# Patient Record
Sex: FEMALE | Race: BLACK OR AFRICAN AMERICAN | NOT HISPANIC OR LATINO | Employment: STUDENT | URBAN - METROPOLITAN AREA
[De-identification: names, ages, dates, MRNs, and addresses within clinical notes are randomized per-mention and may not be internally consistent; named-entity substitution may affect disease eponyms.]

---

## 2020-03-31 PROBLEM — A54.9 GONORRHEA IN FEMALE: Status: ACTIVE | Noted: 2020-03-31

## 2020-07-31 PROBLEM — F33.1 MAJOR DEPRESSIVE DISORDER, RECURRENT, MODERATE: Status: ACTIVE | Noted: 2020-07-31

## 2020-07-31 PROBLEM — B20 HIV DISEASE: Chronic | Status: ACTIVE | Noted: 2020-07-31

## 2020-07-31 PROBLEM — K21.9 GERD (GASTROESOPHAGEAL REFLUX DISEASE): Chronic | Status: ACTIVE | Noted: 2020-07-31

## 2020-07-31 PROBLEM — F41.9 ANXIETY DISORDER, UNSPECIFIED: Status: ACTIVE | Noted: 2020-07-31

## 2023-04-25 ENCOUNTER — SOCIAL WORK (OUTPATIENT)
Dept: ADMINISTRATIVE | Facility: OTHER | Age: 28
End: 2023-04-25
Payer: COMMERCIAL

## 2023-04-25 NOTE — PROGRESS NOTES
SW received a consult for assistance with transportation. SW placed a phone to pt @ [phone] to discuss. YOLANDE provided pt with Medicaid transportation contact information and explained the process of calling at minimum 48 hours in advance to schedule a ride for scheduled appt. YOLANDE also informed pt that the [employer] bus company is free to the public at this time. Pt thanked YOLANDE for the information and states she will utilize Medicaid transportation benefits. NO other needs at this time.     AGUSTIN Navarro    [phone] (phone)  [phone] (fax)

## 2023-05-10 PROBLEM — R63.5 WEIGHT GAIN: Status: ACTIVE | Noted: 2023-05-10

## 2023-05-10 PROBLEM — R10.9 STOMACH ACHE: Status: ACTIVE | Noted: 2023-05-10

## 2023-05-10 PROBLEM — K59.00 CONSTIPATION: Status: ACTIVE | Noted: 2023-05-10

## 2023-05-10 PROBLEM — R51.9 FRONTAL HEADACHE: Status: ACTIVE | Noted: 2023-05-10

## 2023-06-15 PROBLEM — Z72.0 TOBACCO ABUSE: Status: ACTIVE | Noted: 2023-06-15

## 2023-06-15 PROBLEM — H53.8 BLURRY VISION, RIGHT EYE: Status: ACTIVE | Noted: 2023-06-15

## 2023-06-15 PROBLEM — G47.00 INSOMNIA: Status: ACTIVE | Noted: 2023-06-15

## 2023-06-15 PROBLEM — F17.200 TOBACCO DEPENDENCE: Status: ACTIVE | Noted: 2023-06-15

## 2023-06-15 PROBLEM — F32.A ANXIETY AND DEPRESSION: Status: ACTIVE | Noted: 2020-07-31

## 2023-06-15 PROBLEM — Z00.00 HEALTH CARE MAINTENANCE: Status: ACTIVE | Noted: 2023-06-15

## 2023-08-31 ENCOUNTER — PATIENT OUTREACH (OUTPATIENT)
Dept: ADMINISTRATIVE | Facility: HOSPITAL | Age: 28
End: 2023-08-31
Payer: COMMERCIAL

## 2023-09-18 PROBLEM — Z00.00 HEALTH CARE MAINTENANCE: Status: RESOLVED | Noted: 2023-06-15 | Resolved: 2023-09-18

## 2023-10-03 PROBLEM — R90.89 MAGNETIC RESONANCE IMAGING OF BRAIN ABNORMAL: Status: ACTIVE | Noted: 2023-10-03

## 2023-10-03 PROBLEM — D49.6 NEOPLASM OF BRAIN CAUSING MASS EFFECT ON ADJACENT STRUCTURES: Status: ACTIVE | Noted: 2023-10-03

## 2023-10-25 PROBLEM — B20: Status: ACTIVE | Noted: 2023-10-25

## 2023-10-25 PROBLEM — G96.9: Status: ACTIVE | Noted: 2023-10-25

## 2023-10-25 PROBLEM — G93.9 BRAIN LESION: Status: ACTIVE | Noted: 2023-10-25

## 2023-12-04 PROBLEM — G43.009 MIGRAINE WITHOUT AURA AND WITHOUT STATUS MIGRAINOSUS, NOT INTRACTABLE: Status: ACTIVE | Noted: 2023-12-04

## 2023-12-04 PROBLEM — G43.709 CHRONIC MIGRAINE WITHOUT AURA WITHOUT STATUS MIGRAINOSUS, NOT INTRACTABLE: Status: ACTIVE | Noted: 2023-12-04

## 2024-02-14 PROBLEM — R07.9 CHEST PAIN: Status: ACTIVE | Noted: 2024-02-14

## 2024-11-27 PROBLEM — R55 SITUATIONAL SYNCOPE: Status: ACTIVE | Noted: 2024-11-27

## 2024-11-27 PROBLEM — E66.01 CLASS 2 SEVERE OBESITY WITH SERIOUS COMORBIDITY AND BODY MASS INDEX (BMI) OF 39.0 TO 39.9 IN ADULT: Status: ACTIVE | Noted: 2024-11-27

## 2024-11-27 PROBLEM — E28.2 PCOS (POLYCYSTIC OVARIAN SYNDROME): Status: ACTIVE | Noted: 2024-11-27

## 2024-11-27 PROBLEM — E66.812 CLASS 2 SEVERE OBESITY WITH SERIOUS COMORBIDITY AND BODY MASS INDEX (BMI) OF 39.0 TO 39.9 IN ADULT: Status: ACTIVE | Noted: 2024-11-27

## 2024-12-04 PROBLEM — G47.30 SLEEP APNEA: Status: ACTIVE | Noted: 2024-12-04

## 2025-03-14 ENCOUNTER — PATIENT OUTREACH (OUTPATIENT)
Dept: ADMINISTRATIVE | Facility: HOSPITAL | Age: 30
End: 2025-03-14
Payer: COMMERCIAL

## 2025-06-17 ENCOUNTER — PATIENT OUTREACH (OUTPATIENT)
Dept: ADMINISTRATIVE | Facility: HOSPITAL | Age: 30
End: 2025-06-17
Payer: COMMERCIAL